# Patient Record
Sex: MALE | URBAN - METROPOLITAN AREA
[De-identification: names, ages, dates, MRNs, and addresses within clinical notes are randomized per-mention and may not be internally consistent; named-entity substitution may affect disease eponyms.]

---

## 2022-07-18 ENCOUNTER — OFFICE VISIT (OUTPATIENT)
Dept: URBAN - METROPOLITAN AREA CLINIC 98 | Facility: CLINIC | Age: 87
End: 2022-07-18

## 2022-08-08 ENCOUNTER — LAB OUTSIDE AN ENCOUNTER (OUTPATIENT)
Dept: URBAN - METROPOLITAN AREA CLINIC 98 | Facility: CLINIC | Age: 87
End: 2022-08-08

## 2022-08-08 ENCOUNTER — OFFICE VISIT (OUTPATIENT)
Dept: URBAN - METROPOLITAN AREA CLINIC 98 | Facility: CLINIC | Age: 87
End: 2022-08-08
Payer: MEDICARE

## 2022-08-08 VITALS
HEART RATE: 77 BPM | WEIGHT: 178.6 LBS | BODY MASS INDEX: 28.03 KG/M2 | HEIGHT: 67 IN | DIASTOLIC BLOOD PRESSURE: 90 MMHG | SYSTOLIC BLOOD PRESSURE: 167 MMHG | TEMPERATURE: 97.2 F

## 2022-08-08 DIAGNOSIS — K21.9 GERD: ICD-10-CM

## 2022-08-08 DIAGNOSIS — Z79.01 ANTICOAGULANT LONG-TERM USE: ICD-10-CM

## 2022-08-08 DIAGNOSIS — R19.7 DIARRHEA OF PRESUMED INFECTIOUS ORIGIN: ICD-10-CM

## 2022-08-08 DIAGNOSIS — Z12.11 COLON CANCER SCREENING: ICD-10-CM

## 2022-08-08 DIAGNOSIS — E87.1 HYPONATREMIA: ICD-10-CM

## 2022-08-08 PROBLEM — 235595009 GASTROESOPHAGEAL REFLUX DISEASE: Status: ACTIVE | Noted: 2022-08-08

## 2022-08-08 PROBLEM — 711150003 LONG-TERM CURRENT USE OF ANTICOAGULANT: Status: ACTIVE | Noted: 2022-08-08

## 2022-08-08 PROBLEM — 43240000: Status: ACTIVE | Noted: 2022-08-08

## 2022-08-08 PROBLEM — 89627008: Status: ACTIVE | Noted: 2022-08-08

## 2022-08-08 PROCEDURE — G8482 FLU IMMUNIZE ORDER/ADMIN: HCPCS | Performed by: INTERNAL MEDICINE

## 2022-08-08 PROCEDURE — 99204 OFFICE O/P NEW MOD 45 MIN: CPT | Performed by: INTERNAL MEDICINE

## 2022-08-08 PROCEDURE — 3017F COLORECTAL CA SCREEN DOC REV: CPT | Performed by: INTERNAL MEDICINE

## 2022-08-08 PROCEDURE — G9903 PT SCRN TBCO ID AS NON USER: HCPCS | Performed by: INTERNAL MEDICINE

## 2022-08-08 PROCEDURE — G8427 DOCREV CUR MEDS BY ELIG CLIN: HCPCS | Performed by: INTERNAL MEDICINE

## 2022-08-08 PROCEDURE — G8420 CALC BMI NORM PARAMETERS: HCPCS | Performed by: INTERNAL MEDICINE

## 2022-08-08 RX ORDER — LOSARTAN POTASSIUM 50 MG/1
1 TABLET TABLET ORAL ONCE A DAY
Status: ACTIVE | COMMUNITY

## 2022-08-08 RX ORDER — MIRTAZAPINE 30 MG/1
1 TABLET AT BEDTIME TABLET, FILM COATED ORAL ONCE A DAY
Status: ACTIVE | COMMUNITY

## 2022-08-08 RX ORDER — OMEPRAZOLE MAGNESIUM 10 MG/1
AS DIRECTED GRANULE, DELAYED RELEASE ORAL
Status: ACTIVE | COMMUNITY

## 2022-08-08 RX ORDER — APIXABAN 2.5 MG/1
AS DIRECTED TABLET, FILM COATED ORAL
Status: ACTIVE | COMMUNITY

## 2022-08-08 RX ORDER — EZETIMIBE 10 MG/1
1 TABLET TABLET ORAL ONCE A DAY
Status: ACTIVE | COMMUNITY

## 2022-08-08 RX ORDER — METOPROLOL SUCCINATE 100 MG/1
1 TABLET TABLET, FILM COATED, EXTENDED RELEASE ORAL ONCE A DAY
Status: ACTIVE | COMMUNITY

## 2022-08-08 RX ORDER — ATORVASTATIN CALCIUM 40 MG/1
1 TABLET TABLET, FILM COATED ORAL ONCE A DAY
Status: ACTIVE | COMMUNITY

## 2022-08-08 NOTE — HPI-TODAY'S VISIT:
PMH A-fib on Eliquis, HTN, hyperlipidemia, and stroke in Feb. 2021 Had fall in 7/2022 after fall while taking Cialis and Rapaflo About 6 weeks ago he started having diarrhea No clear inciting factor Diarrhea has actually improved some since stopping diary and some medications No blood or mucus in stools

## 2022-08-09 LAB
A/G RATIO: 1.6
ABSOLUTE BASOPHILS: 49
ABSOLUTE EOSINOPHILS: 147
ABSOLUTE LYMPHOCYTES: 1967
ABSOLUTE MONOCYTES: 756
ABSOLUTE NEUTROPHILS: 4081
ALBUMIN: 4.4
ALKALINE PHOSPHATASE: 69
ALT (SGPT): 17
AST (SGOT): 23
BASOPHILS: 0.7
BILIRUBIN, TOTAL: 0.6
BUN/CREATININE RATIO: 20
BUN: 37
CALCIUM: 9.7
CARBON DIOXIDE, TOTAL: 24
CHLORIDE: 104
CREATININE: 1.84
EGFR: 35
EOSINOPHILS: 2.1
GLOBULIN, TOTAL: 2.8
GLUCOSE: 95
HEMATOCRIT: 38.8
HEMOGLOBIN: 13.2
IMMUNOGLOBULIN A, QN, SERUM: 242
INTERPRETATION: (no result)
LYMPHOCYTES: 28.1
MCH: 32.4
MCHC: 34
MCV: 95.1
MONOCYTES: 10.8
MPV: 10.3
NEUTROPHILS: 58.3
PLATELET COUNT: 273
POTASSIUM: 4.6
PROTEIN, TOTAL: 7.2
RDW: 11.9
RED BLOOD CELL COUNT: 4.08
SODIUM: 138
T-TRANSGLUTAMINASE (TTG) IGA: <1
WHITE BLOOD CELL COUNT: 7

## 2023-09-05 ENCOUNTER — DASHBOARD ENCOUNTERS (OUTPATIENT)
Age: 88
End: 2023-09-05

## 2023-09-05 ENCOUNTER — WEB ENCOUNTER (OUTPATIENT)
Dept: URBAN - METROPOLITAN AREA CLINIC 98 | Facility: CLINIC | Age: 88
End: 2023-09-05

## 2023-09-05 ENCOUNTER — OFFICE VISIT (OUTPATIENT)
Dept: URBAN - METROPOLITAN AREA CLINIC 98 | Facility: CLINIC | Age: 88
End: 2023-09-05
Payer: MEDICARE

## 2023-09-05 VITALS
TEMPERATURE: 97.1 F | WEIGHT: 171.6 LBS | HEART RATE: 80 BPM | BODY MASS INDEX: 26.93 KG/M2 | DIASTOLIC BLOOD PRESSURE: 73 MMHG | HEIGHT: 67 IN | SYSTOLIC BLOOD PRESSURE: 124 MMHG

## 2023-09-05 DIAGNOSIS — R19.7 DIARRHEA OF PRESUMED INFECTIOUS ORIGIN: ICD-10-CM

## 2023-09-05 DIAGNOSIS — Z79.01 ANTICOAGULANT LONG-TERM USE: ICD-10-CM

## 2023-09-05 DIAGNOSIS — E87.1 HYPONATREMIA: ICD-10-CM

## 2023-09-05 DIAGNOSIS — K21.9 GERD: ICD-10-CM

## 2023-09-05 DIAGNOSIS — R19.8 ALTERNATING CONSTIPATION AND DIARRHEA: ICD-10-CM

## 2023-09-05 DIAGNOSIS — Z12.11 COLON CANCER SCREENING: ICD-10-CM

## 2023-09-05 PROCEDURE — 99213 OFFICE O/P EST LOW 20 MIN: CPT | Performed by: INTERNAL MEDICINE

## 2023-09-05 RX ORDER — OMEPRAZOLE MAGNESIUM 10 MG/1
AS DIRECTED GRANULE, DELAYED RELEASE ORAL
Status: ACTIVE | COMMUNITY

## 2023-09-05 RX ORDER — LOSARTAN POTASSIUM 50 MG/1
1 TABLET TABLET ORAL ONCE A DAY
Status: ACTIVE | COMMUNITY

## 2023-09-05 RX ORDER — MIRTAZAPINE 30 MG/1
1 TABLET AT BEDTIME TABLET, FILM COATED ORAL ONCE A DAY
Status: ACTIVE | COMMUNITY

## 2023-09-05 RX ORDER — EZETIMIBE 10 MG/1
1 TABLET TABLET ORAL ONCE A DAY
Status: ACTIVE | COMMUNITY

## 2023-09-05 RX ORDER — APIXABAN 2.5 MG/1
AS DIRECTED TABLET, FILM COATED ORAL
Status: ACTIVE | COMMUNITY

## 2023-09-05 RX ORDER — ATORVASTATIN CALCIUM 40 MG/1
1 TABLET TABLET, FILM COATED ORAL ONCE A DAY
Status: ACTIVE | COMMUNITY

## 2023-09-05 RX ORDER — METOPROLOL SUCCINATE 100 MG/1
1 TABLET TABLET, FILM COATED, EXTENDED RELEASE ORAL ONCE A DAY
Status: ACTIVE | COMMUNITY

## 2023-09-05 NOTE — HPI-TODAY'S VISIT:
f/u visit When he made the appointment, he was having alternating constipation and diarrhea This has actually resolved as of visit today Has been dealing with issues with abnormal cells in the urinary tract Working with Urology Of note he had bowel issues around the time of antibiotic use   . PRIOR VISIT: [facility] A-fib on Eliquis, HTN, hyperlipidemia, and stroke in Feb. 2021 Had fall in 7/2022 after fall while taking Cialis and Rapaflo About 6 weeks ago he started having diarrhea No clear inciting factor Diarrhea has actually improved some since stopping diary and some medications No blood or mucus in stools